# Patient Record
Sex: FEMALE | Race: BLACK OR AFRICAN AMERICAN | NOT HISPANIC OR LATINO | Employment: FULL TIME | URBAN - METROPOLITAN AREA
[De-identification: names, ages, dates, MRNs, and addresses within clinical notes are randomized per-mention and may not be internally consistent; named-entity substitution may affect disease eponyms.]

---

## 2017-05-24 ENCOUNTER — APPOINTMENT (OUTPATIENT)
Dept: WOMENS IMAGING | Facility: HOSPITAL | Age: 52
End: 2017-05-24

## 2017-05-24 PROCEDURE — G0202 SCR MAMMO BI INCL CAD: HCPCS | Performed by: RADIOLOGY

## 2017-05-24 PROCEDURE — 77067 SCR MAMMO BI INCL CAD: CPT | Performed by: RADIOLOGY

## 2018-03-22 ENCOUNTER — HOSPITAL ENCOUNTER (EMERGENCY)
Facility: HOSPITAL | Age: 53
Discharge: HOME | End: 2018-03-22
Attending: EMERGENCY MEDICINE | Admitting: EMERGENCY MEDICINE

## 2018-03-22 ENCOUNTER — APPOINTMENT (OUTPATIENT)
Dept: GENERAL RADIOLOGY | Facility: HOSPITAL | Age: 53
End: 2018-03-22

## 2018-03-22 VITALS
BODY MASS INDEX: 35.99 KG/M2 | DIASTOLIC BLOOD PRESSURE: 71 MMHG | HEART RATE: 72 BPM | RESPIRATION RATE: 18 BRPM | SYSTOLIC BLOOD PRESSURE: 121 MMHG | TEMPERATURE: 98.1 F | OXYGEN SATURATION: 98 % | WEIGHT: 216 LBS | HEIGHT: 65 IN

## 2018-03-22 DIAGNOSIS — S39.012A LUMBAR SPINE STRAIN, INITIAL ENCOUNTER: ICD-10-CM
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
## 2018-03-22 DIAGNOSIS — S16.1XXA CERVICAL STRAIN, ACUTE, INITIAL ENCOUNTER: ICD-10-CM

## 2018-03-22 PROCEDURE — 99283 EMERGENCY DEPT VISIT LOW MDM: CPT

## 2018-03-22 PROCEDURE — 72110 X-RAY EXAM L-2 SPINE 4/>VWS: CPT

## 2018-03-22 PROCEDURE — 72050 X-RAY EXAM NECK SPINE 4/5VWS: CPT

## 2018-03-22 RX ORDER — BACLOFEN 10 MG/1
10 TABLET ORAL 3 TIMES DAILY PRN
Qty: 21 TABLET | Refills: 0 | Status: SHIPPED | OUTPATIENT
Start: 2018-03-22 | End: 2022-03-09 | Stop reason: ALTCHOICE
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
## 2018-04-03 ENCOUNTER — TRANSCRIBE ORDERS (OUTPATIENT)
Dept: PHYSICAL THERAPY | Facility: CLINIC | Age: 53
End: 2018-04-03

## 2018-04-03 DIAGNOSIS — S16.1XXA STRAIN OF NECK MUSCLE, INITIAL ENCOUNTER: Primary | ICD-10-CM

## 2018-04-03 DIAGNOSIS — S39.012A STRAIN OF LUMBAR REGION, INITIAL ENCOUNTER: ICD-10-CM

## 2018-04-05 ENCOUNTER — TREATMENT (OUTPATIENT)
Dept: PHYSICAL THERAPY | Facility: CLINIC | Age: 53
End: 2018-04-05

## 2018-04-05 DIAGNOSIS — M54.2 PAIN, NECK: ICD-10-CM

## 2018-04-05 DIAGNOSIS — M54.41 ACUTE RIGHT-SIDED LOW BACK PAIN WITH RIGHT-SIDED SCIATICA: Primary | ICD-10-CM

## 2018-04-05 PROCEDURE — 97001 PR PHYS THERAPY EVALUATION: CPT | Performed by: PHYSICAL THERAPIST

## 2018-04-05 PROCEDURE — 97014 ELECTRIC STIMULATION THERAPY: CPT | Performed by: PHYSICAL THERAPIST

## 2018-04-05 PROCEDURE — 97110 THERAPEUTIC EXERCISES: CPT | Performed by: PHYSICAL THERAPIST
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
## 2018-04-06 ENCOUNTER — TREATMENT (OUTPATIENT)
Dept: PHYSICAL THERAPY | Facility: CLINIC | Age: 53
End: 2018-04-06

## 2018-04-06 DIAGNOSIS — M54.2 PAIN, NECK: ICD-10-CM

## 2018-04-06 DIAGNOSIS — M54.41 ACUTE RIGHT-SIDED LOW BACK PAIN WITH RIGHT-SIDED SCIATICA: Primary | ICD-10-CM

## 2018-04-06 PROCEDURE — 97112 NEUROMUSCULAR REEDUCATION: CPT | Performed by: PHYSICAL THERAPIST

## 2018-04-06 PROCEDURE — 97014 ELECTRIC STIMULATION THERAPY: CPT | Performed by: PHYSICAL THERAPIST

## 2018-04-06 PROCEDURE — 97110 THERAPEUTIC EXERCISES: CPT | Performed by: PHYSICAL THERAPIST

## 2018-04-06 NOTE — PROGRESS NOTES
Physical Therapy Daily Progress Note    Time In 228  Time Out 326        Subjective  Patient reports that her back feels a little better (pain 3/10), pain in the neck is at a 5/10.    Objective   See Exercise, Manual, and Modality Logs for complete treatment.       Assessment/Plan  Patient tolerated the progression of exercises with visual or verbal signs of pain or discomfort in the cervical or lumbar spine.  Added KT to the lumbar spine to help with the c/o tightness.                     Manual Therapy:    0     mins  13543;  Therapeutic Exercise:    31     mins  12714;     Neuromuscular Abbie:    8    mins  50601;    Therapeutic Activity:     0     mins  20092;     Gait Trainin     mins  75875;     Ultrasound:     0     mins  22346;    Work Hardening           0      mins 82262  Iontophoresis               0   mins 42303    Timed Treatment:   39   mins   Total Treatment:     58   mins    Titi Orozco, PT  Physical Therapist

## 2018-04-09 ENCOUNTER — TREATMENT (OUTPATIENT)
Dept: PHYSICAL THERAPY | Facility: CLINIC | Age: 53
End: 2018-04-09

## 2018-04-09 DIAGNOSIS — M54.41 ACUTE RIGHT-SIDED LOW BACK PAIN WITH RIGHT-SIDED SCIATICA: Primary | ICD-10-CM

## 2018-04-09 DIAGNOSIS — M54.2 PAIN, NECK: ICD-10-CM

## 2018-04-09 PROCEDURE — 97014 ELECTRIC STIMULATION THERAPY: CPT | Performed by: PHYSICAL THERAPIST

## 2018-04-09 PROCEDURE — 97112 NEUROMUSCULAR REEDUCATION: CPT | Performed by: PHYSICAL THERAPIST

## 2018-04-09 PROCEDURE — 97110 THERAPEUTIC EXERCISES: CPT | Performed by: PHYSICAL THERAPIST

## 2018-04-09 NOTE — PROGRESS NOTES
"Re-Assessment / Re-Certification        Patient: Jazlyn Cole   : 1965  Diagnosis/ICD-10 Code:  Acute right-sided low back pain with right-sided sciatica [M54.41]  Referring practitioner: Shay Sanchez MD  Patient seen for 3 sessions      Subjective:   Jazlyn Cole reports:  Clinical Progress: improved  Home Program Compliance: Yes  Treatment has included: therapeutic exercise, neuromuscular re-education, electrical stimulation and moist heat    Subjective Evaluation    History of Present Illness    Subjective comment: the pt reports her LBP is improving, but she still has constant neck pain.  The pt reports compliance with her HEP, but is not sure if the stretches are helping to decrease her pain.     Objective       Active Range of Motion     Additional Active Range of Motion Details  Lumbar ROM: all movements are WFL/full range, but the pt reports she has a \"pulling\" feeling in her back with flex and bilateral side bending and \"discomfort\" in her back with extension.     strength:   R: 15#/force, but required verbal and tactile cues to .  L: 20#/force, but required verbal and tactile cues to .    The pt demo the ability to have WFL for all shoulder ROM and only reported slight stretching discomfort when asked.    The pt's cervical ROM is WFL bilaterally and only had pain with L rotation.     Assessment & Plan     Assessment  Assessment details: The pt has slight improvement since her initial evaluation with no reports of constant headaches, no numbness/tingling into her leg with driving, and full ROM with shoulder and cervical ROM.  The pt demo the ability to perform work simulated squatting with requiring few cues for proper form.      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  The pt would still benefit from skilled PT care to address these limitations to increase her ROM, strength " and repetitive movements for work.  It is unclear why the pt has a dramatically limited  strength bilaterally and compared to her initial evaluation.  The pt reports she does not have  strength loss to her knowledge.  Prognosis details: Short Term Goals (2 weeks)    Patient is independent with HEP. MET  Patient is able to sleep without being disrupted by pain. NOT MET, reports waking 2x/night  Patient has full AROM/PROM of right shoulder abd. NO TMET  Patient has greater than 50% improvement overall. NOT MET, 40%  Patient is able to reach into overhead cabinets with minimal discomfort. NOT MET    Long Term Goals (4 weeks)    Patient is able to return to work with minimal to no discomfort. NOT MET  Patient is able to lift 50 lbs from floor to waist. NOT MET  Patient is able to lift 20 lbs from waist to shoulder. NOT MET  Patient is able to have equal  strength bilaterally. 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 MET  Patient is able to open a jar as needed for daily tasks.  NOT MET    Plan  Plan details: Continue skilled PT care to address functional limitations of cervical ROM and strength needed for work duties to return the pt to her PLOF, as appropriate.        Progress toward previous goals: Partially Met  Recommendations: Continue as planned  Timeframe: 1 month  Prognosis to achieve goals: good    PT Signature: Hannah Bashir PT      Based upon review of the patient's progress and continued therapy plan, it is my medical opinion that Jazlyn Cole should continue physical therapy treatment at [facility]  [address] [place] [id number] [phone].    Signature: __________________________________  Shay Sanchez MD    Manual Therapy:    0     mins  32913;  Therapeutic Exercise:    23     mins  37810;     Neuromuscular Abbie:    23    mins  23323;    Therapeutic Activity:      0     mins  70060;     Gait Trainin     mins  06116;     Ultrasound:     0     mins  54686;    Work Hardening           0      mins 32740  Iontophoresis               0   mins 66993    Timed Treatment:   46   mins   Total Treatment:     61   mins

## 2018-04-11 ENCOUNTER — TREATMENT (OUTPATIENT)
Dept: PHYSICAL THERAPY | Facility: CLINIC | Age: 53
End: 2018-04-11

## 2018-04-11 DIAGNOSIS — M54.41 ACUTE RIGHT-SIDED LOW BACK PAIN WITH RIGHT-SIDED SCIATICA: Primary | ICD-10-CM

## 2018-04-11 DIAGNOSIS — M54.2 PAIN, NECK: ICD-10-CM

## 2018-04-11 PROCEDURE — 97112 NEUROMUSCULAR REEDUCATION: CPT | Performed by: PHYSICAL THERAPIST

## 2018-04-11 PROCEDURE — 97140 MANUAL THERAPY 1/> REGIONS: CPT | Performed by: PHYSICAL THERAPIST

## 2018-04-11 PROCEDURE — 97110 THERAPEUTIC EXERCISES: CPT | Performed by: PHYSICAL THERAPIST

## 2018-04-11 NOTE — PROGRESS NOTES
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  She reports although she did stretch after driving, the pain continued.  The pt reports her neck pain continues to have no change.       Objective   See Exercise, Manual, and Modality Logs for complete treatment.       Assessment & Plan     Assessment  Assessment details: The pt is tolerating treatment well without reports of pain or a negative change in status.  The pt demo an increase in strength and endurance with the ability to complete more exercises and exercise with a resistance band.  The pt's LBP was dramatically decreased following lumbar extension exercises and manual therapy.  The pt reported muscle soreness following her exercises, but no pain.  Following modalities, the pt did not report pain or any soreness.  The pt was educated on the potential for DOMS.  The pt was also encouraged to use a lumbar support pillow in her car secondary to spending an increased amount of time in her car for work to address her lumbar pain relief.     Plan  Plan details: Continue skilled PT care to address functional limitations of lumbar extension ROM and back pain to return the pt to her PLOF, as appropriate.                       Manual Therapy:    13     mins  46677;  Therapeutic Exercise:    21     mins  83703;     Neuromuscular Abbie:    23    mins  58281;    Therapeutic Activity:     0     mins  38206;     Gait Trainin     mins  85649;     Ultrasound:     0     mins  24550;    Work Hardening           0      mins 37138  Iontophoresis               0   mins 40864    Timed Treatment:   57   mins   Total Treatment:     72   mins    Hannah Bashir PT  Physical Therapist

## 2018-04-13 ENCOUNTER — TREATMENT (OUTPATIENT)
Dept: PHYSICAL THERAPY | Facility: CLINIC | Age: 53
End: 2018-04-13

## 2018-04-13 DIAGNOSIS — M54.2 PAIN, NECK: ICD-10-CM

## 2018-04-13 DIAGNOSIS — M54.41 ACUTE RIGHT-SIDED LOW BACK PAIN WITH RIGHT-SIDED SCIATICA: Primary | ICD-10-CM

## 2018-04-13 PROCEDURE — 97110 THERAPEUTIC EXERCISES: CPT | Performed by: PHYSICAL THERAPIST

## 2018-04-13 PROCEDURE — 97112 NEUROMUSCULAR REEDUCATION: CPT | Performed by: PHYSICAL THERAPIST

## 2018-04-13 PROCEDURE — 97140 MANUAL THERAPY 1/> REGIONS: CPT | Performed by: PHYSICAL THERAPIST

## 2018-04-13 NOTE — PROGRESS NOTES
Physical Therapy Daily Progress Note            Subjective Evaluation    History of Present Illness    Subjective comment: The pt reports her LBP is tight from her last appointment, but has no pain.  The pt reports although she is compliant with her HEP she does not have a change in neck stiffness, but does feel there is a slight improvement in pain.       Objective   See Exercise, Manual, and Modality Logs for complete treatment.       Assessment & Plan     Assessment  Assessment details: The pt is tolerating treatment well without reports of pain or a negative change in status.  The pt demo an increase in shoulder strength with the ability to complete more exercises and exercises with a resistance band.  The pt reported muscular fatigue during exercises, but was able to complete a second set following passive stretching.  The pt's hips presented with a posteriorly rotated L pelvis and following manual therapy, the hips presented with a more level and equal presentation.  The pt left the appointment without reports of pain.     Plan  Plan details: Continue skilled PT care to address functional limitations of cervical and lumbar ROM and strength to return the pt to her PLOF, as appropriate.                       Manual Therapy:   12     mins  61604;  Therapeutic Exercise:    21     mins  75896;     Neuromuscular Abbie:    25    mins  23527;    Therapeutic Activity:     0     mins  64582;     Gait Trainin     mins  83063;     Ultrasound:     0     mins  11110;    Work Hardening           0      mins 02623  Iontophoresis               0   mins 60213    Timed Treatment:   58   mins   Total Treatment:     58   mins    Hannah Bashir PT  Physical Therapist

## 2018-04-16 ENCOUNTER — TREATMENT (OUTPATIENT)
Dept: PHYSICAL THERAPY | Facility: CLINIC | Age: 53
End: 2018-04-16

## 2018-04-16 DIAGNOSIS — M54.41 ACUTE RIGHT-SIDED LOW BACK PAIN WITH RIGHT-SIDED SCIATICA: Primary | ICD-10-CM

## 2018-04-16 DIAGNOSIS — M54.2 PAIN, NECK: ICD-10-CM

## 2018-04-16 PROCEDURE — 97112 NEUROMUSCULAR REEDUCATION: CPT | Performed by: PHYSICAL THERAPIST

## 2018-04-16 PROCEDURE — 97110 THERAPEUTIC EXERCISES: CPT | Performed by: PHYSICAL THERAPIST

## 2018-04-16 PROCEDURE — 97140 MANUAL THERAPY 1/> REGIONS: CPT | Performed by: PHYSICAL THERAPIST

## 2018-04-20 ENCOUNTER — TREATMENT (OUTPATIENT)
Dept: PHYSICAL THERAPY | Facility: CLINIC | Age: 53
End: 2018-04-20

## 2018-04-20 DIAGNOSIS — M54.41 ACUTE RIGHT-SIDED LOW BACK PAIN WITH RIGHT-SIDED SCIATICA: Primary | ICD-10-CM

## 2018-04-20 PROCEDURE — 97110 THERAPEUTIC EXERCISES: CPT | Performed by: PHYSICAL THERAPIST

## 2018-04-20 NOTE — PROGRESS NOTES
Physical Therapy Daily Progress Note            Subjective Patient states that her back feels a lot better, but reports a little pain in the neck.      Objective   See Exercise, Manual, and Modality Logs for complete treatment.       Assessment/Plan  Patient performed the exercise routine without c/o pain or discomfort in the cervical.   Held estim and ice per patient request.                 Manual Therapy:    0     mins  20194;  Therapeutic Exercise:    50     mins  94754;     Neuromuscular Abbie:    0    mins  07774;    Therapeutic Activity:     0     mins  89971;     Gait Trainin     mins  79231;     Ultrasound:     0     mins  24437;    Work Hardening           0      mins 53918  Iontophoresis               0   mins 50461    Timed Treatment:   50   mins   Total Treatment:     60   mins    Akua Lee, PT  Physical Therapist

## 2018-05-01 ENCOUNTER — TREATMENT (OUTPATIENT)
Dept: PHYSICAL THERAPY | Facility: CLINIC | Age: 53
End: 2018-05-01

## 2018-05-01 DIAGNOSIS — M54.2 PAIN, NECK: ICD-10-CM

## 2018-05-01 DIAGNOSIS — M54.41 ACUTE RIGHT-SIDED LOW BACK PAIN WITH RIGHT-SIDED SCIATICA: Primary | ICD-10-CM

## 2018-05-01 PROCEDURE — 97110 THERAPEUTIC EXERCISES: CPT | Performed by: PHYSICAL THERAPIST

## 2018-05-01 NOTE — PROGRESS NOTES
"Physical Therapy Daily Progress Note            Subjective Evaluation    History of Present Illness    Subjective comment: The pt reports she has good days and bad days at work.  She reports she has been required to perform more physically demanding tasks at work (driving longer, puttting together more machines, bending/twisting more) and it has increased her LBP. Her cerivcal pain comes and goes.  The pt reports stretching and her HEP does help.  Currently, 6/10 neck pain and 4/10 LBP.       Objective       Active Range of Motion     Additional Active Range of Motion Details  The pt demo WFL for all lumbar movements and only reported a stretching pull in her low back with bilateral side bending; no pain.    The pt demo WFL for all cervical movements and no reports of pain to complete the movements.  However, the pt reports she has pain in her neck that \"comes and goes\" without a connection to activity or movement.     strength: bilaterally: 30#/force     See Exercise, Manual, and Modality Logs for complete treatment.       Assessment & Plan     Assessment  Assessment details: The pt is tolerating treatment well without reports of pain or a negative change in status.  The pt demo an increase in activity endurance with the ability to complete an UE exercise AG, an UE exercise with an increase in resistance band level, and a balance/strengthening exercise while on a noncompliant surface without reports of pain.  The pt did have a \"muscle spasm\" in her L low back following prone leg extension, but following LE and back stretching, the spasm went away and she was able to complete the rest of her exercises without pains or compensations.  The pt left her appointment with reports of pain decrease compared to her initial arrival.    Prognosis details: Short Term Goals (2 weeks)    Patient is independent with HEP.  MET  Patient is able to sleep without being disrupted by pain. NOT MET, still reports waking with tingling " into both hands 3x/week.  Patient has full AROM/PROM of right shoulder abd. MET  Patient has greater than 50% improvement overall. BACK: 80% at PLOF, NECK: 50%   Patient is able to reach into overhead cabinets with minimal discomfort. MET    Long Term Goals (4 weeks)    Patient is able to return to work with minimal to no discomfort. NOT MET  Patient is able to lift 50 lbs from floor to waist. NOT MET  Patient is able to lift 20 lbs from waist to shoulder. NOT MET  Patient is able to have equal  strength bilaterally. MET, 30#/force bilaterally.  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 MET  Patient is able to open a jar as needed for daily tasks. MET    Plan  Plan details: Continue skilled PT care to address functional limitations of work simulated tasks to return the pt to her PLOF, as appropriate.                       Manual Therapy:    0     mins  34694;  Therapeutic Exercise:    60     mins  77981;     Neuromuscular Abbie:    0    mins  99105;    Therapeutic Activity:     0     mins  09849;     Gait Trainin     mins  58777;     Ultrasound:     0     mins  78372;    Work Hardening           0      mins 95214  Iontophoresis               0   mins 80859    Timed Treatment:   60   mins   Total Treatment:     60   mins    Hannah Bashir, PT  Physical Therapist

## 2018-05-02 ENCOUNTER — TRANSCRIBE ORDERS (OUTPATIENT)
Dept: ADMINISTRATIVE | Facility: HOSPITAL | Age: 53
End: 2018-05-02

## 2018-05-02 ENCOUNTER — TREATMENT (OUTPATIENT)
Dept: PHYSICAL THERAPY | Facility: CLINIC | Age: 53
End: 2018-05-02

## 2018-05-02 DIAGNOSIS — M54.41 ACUTE RIGHT-SIDED LOW BACK PAIN WITH RIGHT-SIDED SCIATICA: Primary | ICD-10-CM

## 2018-05-02 DIAGNOSIS — S16.1XXD STRAIN, CERVICAL, SUBSEQUENT ENCOUNTER: Primary | ICD-10-CM

## 2018-05-02 DIAGNOSIS — M54.2 PAIN, NECK: ICD-10-CM

## 2018-05-02 DIAGNOSIS — M54.15 RADICULOPATHY OF THORACOLUMBAR REGION: ICD-10-CM

## 2018-05-02 DIAGNOSIS — S39.012D LUMBAR SPINE STRAIN, SUBSEQUENT ENCOUNTER: ICD-10-CM

## 2018-05-02 PROCEDURE — 97530 THERAPEUTIC ACTIVITIES: CPT | Performed by: PHYSICAL THERAPIST

## 2018-05-02 PROCEDURE — 97110 THERAPEUTIC EXERCISES: CPT | Performed by: PHYSICAL THERAPIST

## 2018-05-02 PROCEDURE — 97014 ELECTRIC STIMULATION THERAPY: CPT | Performed by: PHYSICAL THERAPIST

## 2018-05-02 NOTE — PROGRESS NOTES
"Re-Assessment / Re-Certification        Patient: Jazlyn Cole   : 1965  Diagnosis/ICD-10 Code:  Acute right-sided low back pain with right-sided sciatica [M54.41]  Referring practitioner: Shay Sanchez MD  Patient seen for 9 sessions      Subjective:   Jazlyn Cole reports:     Clinical Progress: improved  Home Program Compliance: Yes  Treatment has included: therapeutic exercise and therapeutic activity    Subjective Evaluation    History of Present Illness    Subjective comment: The pt reports her neck and back pains have improved and she finds her HEP helps.  She reports she is able to stand, sit, walk, and reach [place] at her PLOF.  She still has discomfort with driving.       Objective       Active Range of Motion     Additional Active Range of Motion Details  The pt demo WFL for all lumbar movements and only reported a stretching pull in her low back with bilateral side bending; no pain.    The pt demo WFL for all cervical movements and no reports of pain to complete the movements.  However, the pt reports she has pain in her neck that \"comes and goes\" without a connection to activity or movement.     strength: bilaterally: 30#/force     Assessment & Plan     Assessment  Assessment details: The pt has improved from her initial evaluation with improvement in lumbar, shoulder and cervical ROM, increase in strength, return to her PLOF for sitting, standing and walking abilities, and demo ability to complete work simulated lifting of waist/waist 10#, 15#, 20#, 5x each, and waist/floor 20# 5x with proper form and no reports of pain.    The pt continues to report pain in her neck and occasional headaches throughout her day with an increase after driving.    Prognosis details: Short Term Goals (2 weeks)    Patient is independent with HEP.  MET  Patient is able to sleep without being disrupted by pain. NOT MET, still reports waking with tingling into both hands 3x/week.  Patient has full " AROM/PROM of right shoulder abd. MET  Patient has greater than 50% improvement overall. BACK: 80% at PLOF, NECK: 50%   Patient is able to reach into overhead cabinets with minimal discomfort. MET    Long Term Goals (4 weeks)    Patient is able to return to work with minimal to no discomfort. NOT MET  Patient is able to lift 50 lbs from floor to waist. NOT MET  Patient is able to lift 20 lbs from waist to shoulder. NOT MET  Patient is able to have equal  strength bilaterally. MET, 30#/force bilaterally.  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 MET  Patient is able to open a jar as needed for daily tasks. MET    Plan  Plan details: Continue skilled PT care to address functional limitations of work simulated tasks to return the pt to her PLOF, as appropriate.          PT Signature: Hannah Bashir PT      Based upon review of the patient's progress and continued therapy plan, it is my medical opinion that Jazlyn Cole should continue physical therapy treatment at [facility]  [address] [place] [id number] [phone].    Signature: __________________________________  Shay Sanchez MD    Manual Therapy:    0     mins  85873;  Therapeutic Exercise:    38     mins  80918;     Neuromuscular Abbie:    0    mins  38176;    Therapeutic Activity:     8     mins  30368;     Gait Trainin     mins  58042;     Ultrasound:     0     mins  45864;    Work Hardening           0      mins 84873  Iontophoresis               0   mins 17406    Timed Treatment:   46   mins   Total Treatment:     61   mins

## 2018-05-11 ENCOUNTER — HOSPITAL ENCOUNTER (OUTPATIENT)
Dept: MRI IMAGING | Facility: HOSPITAL | Age: 53
Discharge: HOME | End: 2018-05-11

## 2018-05-11 ENCOUNTER — HOSPITAL ENCOUNTER (OUTPATIENT)
Dept: MRI IMAGING | Facility: HOSPITAL | Age: 53
Discharge: HOME | End: 2018-05-11
Admitting: PREVENTIVE MEDICINE

## 2018-05-11 DIAGNOSIS — S16.1XXD STRAIN, CERVICAL, SUBSEQUENT ENCOUNTER: ICD-10-CM

## 2018-05-11 DIAGNOSIS — M54.15 RADICULOPATHY OF THORACOLUMBAR REGION: ICD-10-CM

## 2018-05-11 DIAGNOSIS — S39.012D LUMBAR SPINE STRAIN, SUBSEQUENT ENCOUNTER: ICD-10-CM

## 2018-05-11 PROCEDURE — 72148 MRI LUMBAR SPINE W/O DYE: CPT

## 2018-05-11 PROCEDURE — 72141 MRI NECK SPINE W/O DYE: CPT

## 2018-05-15 ENCOUNTER — TREATMENT (OUTPATIENT)
Dept: PHYSICAL THERAPY | Facility: CLINIC | Age: 53
End: 2018-05-15

## 2018-05-15 DIAGNOSIS — M54.2 PAIN, NECK: ICD-10-CM

## 2018-05-15 DIAGNOSIS — M54.41 ACUTE RIGHT-SIDED LOW BACK PAIN WITH RIGHT-SIDED SCIATICA: Primary | ICD-10-CM

## 2018-05-15 PROCEDURE — 97110 THERAPEUTIC EXERCISES: CPT | Performed by: PHYSICAL THERAPIST

## 2018-05-15 PROCEDURE — 97530 THERAPEUTIC ACTIVITIES: CPT | Performed by: PHYSICAL THERAPIST

## 2018-05-15 NOTE — PROGRESS NOTES
Physical Therapy Daily Progress Note            Subjective Evaluation    History of Present Illness    Subjective comment: The pt reports her LBP continues to improve.  She reports her neck pain is still present and limits her from driving and looking around her work sites.  She states the neck pain is less than the original pains, but is still present.       Objective   See Exercise, Manual, and Modality Logs for complete treatment.       Assessment & Plan     Assessment  Assessment details: The pt was able to demo an increase in cervical control with supine cervical extension and extension with rotation to address her forward head, rounded shoulder posture and to stretch the suboccipital muscles to alive the neck pains.  The pt left her appointment without reports of pain.    Plan  Plan details: Continue skilled PT care to address functional limitations of cervical ROM and neuromuscular control to return the pt to her PLOF, as appropriate.                       Manual Therapy:    0     mins  19588;  Therapeutic Exercise:    38     mins  35148;     Neuromuscular Abbie:    0    mins  89104;    Therapeutic Activity:     8     mins  24109;     Gait Trainin     mins  12574;     Ultrasound:     0     mins  03672;    Work Hardening           0      mins 79194  Iontophoresis               0   mins 35896    Timed Treatment:   48   mins   Total Treatment:     48   mins    Hannah Bashir PT  Physical Therapist

## 2018-05-16 ENCOUNTER — TREATMENT (OUTPATIENT)
Dept: PHYSICAL THERAPY | Facility: CLINIC | Age: 53
End: 2018-05-16

## 2018-05-16 DIAGNOSIS — M54.2 PAIN, NECK: ICD-10-CM

## 2018-05-16 DIAGNOSIS — M54.41 ACUTE RIGHT-SIDED LOW BACK PAIN WITH RIGHT-SIDED SCIATICA: Primary | ICD-10-CM

## 2018-05-16 PROCEDURE — 97110 THERAPEUTIC EXERCISES: CPT | Performed by: PHYSICAL THERAPIST

## 2018-05-16 NOTE — PROGRESS NOTES
Physical Therapy Daily Progress Note            Subjective Evaluation    History of Present Illness    Subjective comment: The pt reports her neck pain has decreased compared to her previous appointment and found the new cervical exercises may have added to her decreased pain.  The pt reports compliance with her HEP and plans to continue them while on vacation.       Objective   See Exercise, Manual, and Modality Logs for complete treatment.       Assessment & Plan     Assessment  Assessment details: The pt is tolerating treatment well without reports of pain or a negative change in status.  The pt demo an increase in activity endurance with the ability to complete more exercises without reports of pain.  The pt was also able to complete AAROM for shoulder flexion, bilaterally, without reports of neck pain or numbness/tingling into her hands.  The pt left her appointment without reports of pain.      Plan  Plan details: Continue skilled PT care to address functional limitations of cervical and lumbar ROM, strength, and work simulation to return the pt to her PLOF, as appropriate.                       Manual Therapy:    0     mins  00657;  Therapeutic Exercise:    54     mins  83881;     Neuromuscular Abbie:    0    mins  59417;    Therapeutic Activity:     0     mins  02639;     Gait Trainin     mins  56740;     Ultrasound:     0     mins  67149;    Work Hardening           0      mins 69672  Iontophoresis               0   mins 21871    Timed Treatment:   54   mins   Total Treatment:     54   mins    Hannah Bashir PT  Physical Therapist

## 2018-05-25 ENCOUNTER — APPOINTMENT (OUTPATIENT)
Dept: WOMENS IMAGING | Facility: HOSPITAL | Age: 53
End: 2018-05-25

## 2018-05-25 ENCOUNTER — TREATMENT (OUTPATIENT)
Dept: PHYSICAL THERAPY | Facility: CLINIC | Age: 53
End: 2018-05-25

## 2018-05-25 DIAGNOSIS — M54.2 PAIN, NECK: ICD-10-CM

## 2018-05-25 DIAGNOSIS — M54.41 ACUTE RIGHT-SIDED LOW BACK PAIN WITH RIGHT-SIDED SCIATICA: Primary | ICD-10-CM

## 2018-05-25 PROCEDURE — 77067 SCR MAMMO BI INCL CAD: CPT | Performed by: RADIOLOGY

## 2018-05-25 PROCEDURE — 97112 NEUROMUSCULAR REEDUCATION: CPT | Performed by: PHYSICAL THERAPIST

## 2018-05-25 PROCEDURE — 97110 THERAPEUTIC EXERCISES: CPT | Performed by: PHYSICAL THERAPIST

## 2018-05-25 NOTE — PROGRESS NOTES
Re-Assessment / Re-Certification    Time In 200 Time Out 235    Patient: Jazlyn Cole   : 1965  Diagnosis/ICD-10 Code:  Acute right-sided low back pain with right-sided sciatica [M54.41]  Referring practitioner: Shay Sanchez MD  Date of Initial Visit: 2018  Today's Date: 18  Patient seen for 12 sessions      Subjective:   Jazlyn Cole reports: that her back is a lot better, denies pain in the back; rates the pain in the neck at -5/10.    Treatment has included: therapeutic exercise, neuromuscular re-education, electrical stimulation and cryotherapy    Subjective   Objective       Palpation     Additional Palpation Details  No TTP to cervical spine region    Active Range of Motion   Cervical/Thoracic Spine   Cervical    Flexion: 39 degrees   Extension: 31 degrees   Left lateral flexion: 18 degrees   Right lateral flexion: 25 degrees     Lumbar   Flexion: 110 degrees   Extension: 28 degrees   Left lateral flexion: 25 degrees   Right lateral flexion: 23 degrees     Additional Active Range of Motion Details  Shoulders:  Flexion WNL, Abduction WNL     Assessment/Plan  Patient has tolerated PT well thus far.  Patient has not been seen since  secondary to being out of town.  Deficits persist with the cerivcal spine, including pain and decreased AROM.  Patient has been seen since 18 and may need further medical management at this time secondary to persistent S/S's.    PT Signature: Titi Orozco, PT        Manual Therapy:    0     mins  82518;  Therapeutic Exercise:    26     mins  38224;     Neuromuscular Abbie:    8    mins  67298;    Therapeutic Activity:     0     mins  75941;     Gait Trainin     mins  64785;     Ultrasound:     0     mins  78330;    Work Hardening           0      mins 21910  Iontophoresis               0   mins 24119    Timed Treatment:   34   mins   Total Treatment:     35   mins

## 2018-06-15 ENCOUNTER — DOCUMENTATION (OUTPATIENT)
Dept: PHYSICAL THERAPY | Facility: CLINIC | Age: 53
End: 2018-06-15

## 2018-06-15 NOTE — PROGRESS NOTES
Discharge Summary  Discharge Summary from Physical Therapy Report      Dates  PT visit: 4/5 to 5/25/18  Number of Visits: 12     Discharge Status of Patient: See MD Note dated 5/25/18    Goals: Partially Met    Discharge Plan: Continue with current home exercise program as instructed    Comments Patient did not return to PT.    Date of Discharge 6/15/18        Titi Orozco, PT  Physical Therapist

## 2018-07-19 ENCOUNTER — HOSPITAL ENCOUNTER (OUTPATIENT)
Dept: GENERAL RADIOLOGY | Facility: HOSPITAL | Age: 53
Discharge: HOME | End: 2018-07-19
Admitting: NURSE PRACTITIONER

## 2018-07-19 DIAGNOSIS — R52 PAIN: ICD-10-CM

## 2018-07-19 PROCEDURE — 73562 X-RAY EXAM OF KNEE 3: CPT

## 2018-07-19 PROCEDURE — 73560 X-RAY EXAM OF KNEE 1 OR 2: CPT

## 2019-03-16 ENCOUNTER — OFFICE VISIT (OUTPATIENT)
Dept: RETAIL CLINIC | Facility: CLINIC | Age: 54
End: 2019-03-16

## 2019-03-16 VITALS
OXYGEN SATURATION: 98 % | HEIGHT: 65 IN | SYSTOLIC BLOOD PRESSURE: 116 MMHG | TEMPERATURE: 98 F | RESPIRATION RATE: 18 BRPM | WEIGHT: 247 LBS | DIASTOLIC BLOOD PRESSURE: 78 MMHG | HEART RATE: 80 BPM | BODY MASS INDEX: 41.15 KG/M2

## 2019-03-16 DIAGNOSIS — R21 RASH AND NONSPECIFIC SKIN ERUPTION: Primary | ICD-10-CM

## 2019-03-16 DIAGNOSIS — L29.9 ITCHING: ICD-10-CM

## 2019-03-16 PROCEDURE — 99213 OFFICE O/P EST LOW 20 MIN: CPT | Performed by: NURSE PRACTITIONER

## 2019-03-16 RX ORDER — HYDROXYZINE HYDROCHLORIDE 25 MG/1
25 TABLET, FILM COATED ORAL 3 TIMES DAILY PRN
Qty: 30 TABLET | Refills: 0 | Status: SHIPPED | OUTPATIENT
Start: 2019-03-16 | End: 2019-03-26

## 2019-03-16 RX ORDER — DESONIDE 0.5 MG/G
CREAM TOPICAL 3 TIMES DAILY
Qty: 15 G | Refills: 0 | Status: SHIPPED | OUTPATIENT
Start: 2019-03-16 | End: 2019-03-26

## 2019-03-16 NOTE — PATIENT INSTRUCTIONS
Rash  A rash is a change in the color of the skin. A rash can also change the way your skin feels. There are many different conditions and factors that can cause a rash.  Follow these instructions at home:  Pay attention to any changes in your symptoms. Follow these instructions to help with your condition:  Medicine  Take or apply over-the-counter and prescription medicines only as told by your doctor. These may include:  · Corticosteroid cream.  · Anti-itch lotions.  · Oral antihistamines.    Skin Care  · Put cool compresses on the affected areas.  · Try taking a bath with:  ? Epsom salts. Follow the instructions on the packaging. You can get these at your local pharmacy or grocery store.  ? Baking soda. Pour a small amount into the bath as told by your doctor.  ? Colloidal oatmeal. Follow the instructions on the packaging. You can get this at your local pharmacy or grocery store.  · Try putting baking soda paste onto your skin. Stir water into baking soda until it gets like a paste.  · Do not scratch or rub your skin.  · Avoid covering the rash. Make sure the rash is exposed to air as much as possible.  General instructions  · Avoid hot showers or baths, which can make itching worse. A cold shower may help.  · Avoid scented soaps, detergents, and perfumes. Use gentle soaps, detergents, perfumes, and other cosmetic products.  · Avoid anything that causes your rash. Keep a journal to help track what causes your rash. Write down:  ? What you eat.  ? What cosmetic products you use.  ? What you drink.  ? What you wear. This includes jewelry.  · Keep all follow-up visits as told by your doctor. This is important.  Contact a doctor if:  · You sweat at night.  · You lose weight.  · You pee (urinate) more than normal.  · You feel weak.  · You throw up (vomit).  · Your skin or the whites of your eyes look yellow (jaundice).  · Your skin:  ? Tingles.  ? Is numb.  · Your rash:  ? Does not go away after a few days.  ? Gets  worse.  · You are:  ? More thirsty than normal.  ? More tired than normal.  · You have:  ? New symptoms.  ? Pain in your belly (abdomen).  ? A fever.  ? Watery poop (diarrhea).  Get help right away if:  · Your rash covers all or most of your body. The rash may or may not be painful.  · You have blisters that:  ? Are on top of the rash.  ? Grow larger.  ? Grow together.  ? Are painful.  ? Are inside your nose or mouth.  · You have a rash that:  ? Looks like purple pinprick-sized spots all over your body.  ? Has a “bull's eye” or looks like a target.  ? Is red and painful, causes your skin to peel, and is not from being in the sun too long.  This information is not intended to replace advice given to you by your health care provider. Make sure you discuss any questions you have with your health care provider.  Document Released: 06/05/2009 Document Revised: 05/25/2017 Document Reviewed: 05/04/2016  Elsevier Interactive Patient Education © 2019 [employer]

## 2019-03-16 NOTE — PROGRESS NOTES
"Subjective   Jazlyn Cole is a 53 y.o. female.     /78 (BP Location: Left arm, Patient Position: Sitting, Cuff Size: Large Adult)   Pulse 80   Temp 98 °F (36.7 °C) (Oral)   Resp 18   Ht 165.1 cm (65\")   Wt 112 kg (247 lb)   SpO2 98%   BMI 41.10 kg/m²       Rash   This is a new problem. The current episode started in the past 7 days. The problem has been gradually worsening since onset. The affected locations include the left lower leg, left upper leg, right lower leg, right upper leg, right arm and left arm. The rash is characterized by itchiness and redness. She was exposed to nothing. Pertinent negatives include no fatigue, fever or joint pain. Past treatments include topical steroids. The treatment provided no relief.        The following portions of the patient's history were reviewed and updated as appropriate: current medications, past family history, past medical history, past social history, past surgical history and problem list.    Review of Systems   Constitutional: Positive for unexpected weight gain (was 223 - 6 months ago - pt states recently retired. may be due to excessive calorie intake). Negative for fatigue and fever.   HENT: Negative.    Eyes: Negative.    Respiratory: Negative.    Cardiovascular: Negative.    Gastrointestinal: Negative.    Endocrine: Negative.    Genitourinary: Negative.    Musculoskeletal: Negative.  Negative for joint pain.   Skin: Positive for rash and skin lesions.   Allergic/Immunologic: Negative.    Neurological: Negative.    Hematological: Negative.    Psychiatric/Behavioral: Negative.        Objective   Physical Exam   Constitutional: She is oriented to person, place, and time. She appears well-developed and well-nourished.   HENT:   Head: Normocephalic and atraumatic.   Right Ear: External ear normal.   Eyes: Conjunctivae and EOM are normal. Pupils are equal, round, and reactive to light.   Neck: Normal range of motion.   Cardiovascular: Normal rate, " regular rhythm and normal heart sounds.   Pulmonary/Chest: Effort normal and breath sounds normal.   Abdominal: Soft. Bowel sounds are normal.   Musculoskeletal: Normal range of motion.   Neurological: She is alert and oriented to person, place, and time.   Skin: Skin is warm. Capillary refill takes less than 2 seconds. Turgor is normal. Lesion and rash noted. There is erythema.   Psychiatric: She has a normal mood and affect.   Vitals reviewed.        Assessment/Plan   Jazlyn was seen today for rash.    Diagnoses and all orders for this visit:    Rash and nonspecific skin eruption  -     desonide (DESOWEN) 0.05 % cream; Apply  topically to the appropriate area as directed 3 (Three) Times a Day for 10 days.  -     PredniSONE 5 MG tablet therapy pack dosepak; Take 1 tablet by mouth Take As Directed. Take as directed on package instructions.    Itching  -     hydrOXYzine (ATARAX) 25 MG tablet; Take 1 tablet by mouth 3 (Three) Times a Day As Needed for Itching for up to 10 days.    pt to get otc claritin or zyrtec to take daily. Pt wgt discussed, pt has gained wgt. Pt to f/u with pcp for labs/ tsh checked. Pt v/u     Rash  A rash is a change in the color of the skin. A rash can also change the way your skin feels. There are many different conditions and factors that can cause a rash.  Follow these instructions at home:  Pay attention to any changes in your symptoms. Follow these instructions to help with your condition:  Medicine  Take or apply over-the-counter and prescription medicines only as told by your doctor. These may include:  · Corticosteroid cream.  · Anti-itch lotions.  · Oral antihistamines.    Skin Care  · Put cool compresses on the affected areas.  · Try taking a bath with:  ? Epsom salts. Follow the instructions on the packaging. You can get these at your local pharmacy or grocery store.  ? Baking soda. Pour a small amount into the bath as told by your doctor.  ? Colloidal oatmeal. Follow the  instructions on the packaging. You can get this at your local pharmacy or grocery store.  · Try putting baking soda paste onto your skin. Stir water into baking soda until it gets like a paste.  · Do not scratch or rub your skin.  · Avoid covering the rash. Make sure the rash is exposed to air as much as possible.  General instructions  · Avoid hot showers or baths, which can make itching worse. A cold shower may help.  · Avoid scented soaps, detergents, and perfumes. Use gentle soaps, detergents, perfumes, and other cosmetic products.  · Avoid anything that causes your rash. Keep a journal to help track what causes your rash. Write down:  ? What you eat.  ? What cosmetic products you use.  ? What you drink.  ? What you wear. This includes jewelry.  · Keep all follow-up visits as told by your doctor. This is important.  Contact a doctor if:  · You sweat at night.  · You lose weight.  · You pee (urinate) more than normal.  · You feel weak.  · You throw up (vomit).  · Your skin or the whites of your eyes look yellow (jaundice).  · Your skin:  ? Tingles.  ? Is numb.  · Your rash:  ? Does not go away after a few days.  ? Gets worse.  · You are:  ? More thirsty than normal.  ? More tired than normal.  · You have:  ? New symptoms.  ? Pain in your belly (abdomen).  ? A fever.  ? Watery poop (diarrhea).  Get help right away if:  · Your rash covers all or most of your body. The rash may or may not be painful.  · You have blisters that:  ? Are on top of the rash.  ? Grow larger.  ? Grow together.  ? Are painful.  ? Are inside your nose or mouth.  · You have a rash that:  ? Looks like purple pinprick-sized spots all over your body.  ? Has a “bull's eye” or looks like a target.  ? Is red and painful, causes your skin to peel, and is not from being in the sun too long.  This information is not intended to replace advice given to you by your health care provider. Make sure you discuss any questions you have with your health care  provider.  Document Released: 06/05/2009 Document Revised: 05/25/2017 Document Reviewed: 05/04/2016  [employer] Interactive Patient Education © 2019 [employer]

## 2022-03-09 ENCOUNTER — HOSPITAL ENCOUNTER (OUTPATIENT)
Dept: CARDIOLOGY | Facility: HOSPITAL | Age: 57
Discharge: HOME | End: 2022-03-09

## 2022-03-09 ENCOUNTER — CONSULT (OUTPATIENT)
Dept: BARIATRICS/WEIGHT MGMT | Facility: CLINIC | Age: 57
End: 2022-03-09

## 2022-03-09 ENCOUNTER — LAB (OUTPATIENT)
Dept: LAB | Facility: HOSPITAL | Age: 57
End: 2022-03-09

## 2022-03-09 ENCOUNTER — HOSPITAL ENCOUNTER (OUTPATIENT)
Dept: GENERAL RADIOLOGY | Facility: HOSPITAL | Age: 57
Discharge: HOME | End: 2022-03-09

## 2022-03-09 VITALS
TEMPERATURE: 96.9 F | HEIGHT: 65 IN | WEIGHT: 246 LBS | SYSTOLIC BLOOD PRESSURE: 132 MMHG | DIASTOLIC BLOOD PRESSURE: 81 MMHG | HEART RATE: 78 BPM | RESPIRATION RATE: 18 BRPM | BODY MASS INDEX: 40.98 KG/M2

## 2022-03-09 DIAGNOSIS — G47.33 OSA ON CPAP: ICD-10-CM

## 2022-03-09 DIAGNOSIS — Z99.89 OSA ON CPAP: ICD-10-CM

## 2022-03-09 DIAGNOSIS — Z87.39 H/O: GOUT: ICD-10-CM

## 2022-03-09 DIAGNOSIS — D57.3 SICKLE CELL TRAIT: ICD-10-CM

## 2022-03-09 DIAGNOSIS — E78.2 MIXED HYPERLIPIDEMIA: ICD-10-CM

## 2022-03-09 DIAGNOSIS — E66.01 OBESITY, CLASS III, BMI 40-49.9 (MORBID OBESITY): ICD-10-CM

## 2022-03-09 DIAGNOSIS — Z71.3 DIETARY COUNSELING: ICD-10-CM

## 2022-03-09 DIAGNOSIS — Z01.818 PREOPERATIVE TESTING: ICD-10-CM

## 2022-03-09 DIAGNOSIS — R42 DIZZINESS: ICD-10-CM

## 2022-03-09 DIAGNOSIS — E66.01 OBESITY, CLASS III, BMI 40-49.9 (MORBID OBESITY): Primary | ICD-10-CM

## 2022-03-09 PROBLEM — R06.83 SNORING: Status: ACTIVE | Noted: 2022-03-09

## 2022-03-09 PROBLEM — G47.30 SLEEP APNEA: Status: ACTIVE | Noted: 2022-03-09

## 2022-03-09 PROBLEM — M10.9 GOUT OF MULTIPLE SITES: Status: ACTIVE | Noted: 2022-03-09

## 2022-03-09 PROBLEM — M10.9 GOUT OF MULTIPLE SITES: Status: RESOLVED | Noted: 2022-03-09 | Resolved: 2022-03-09

## 2022-03-09 LAB
ALBUMIN SERPL-MCNC: 3.8 G/DL (ref 3.5–5.2)
ALBUMIN/GLOB SERPL: 1.1 G/DL
ALP SERPL-CCNC: 96 U/L (ref 39–117)
ALT SERPL W P-5'-P-CCNC: 23 U/L (ref 1–33)
ANION GAP SERPL CALCULATED.3IONS-SCNC: 10.7 MMOL/L (ref 5–15)
AST SERPL-CCNC: 21 U/L (ref 1–32)
BASOPHILS # BLD AUTO: 0.03 10*3/MM3 (ref 0–0.2)
BASOPHILS NFR BLD AUTO: 0.4 % (ref 0–1.5)
BILIRUB SERPL-MCNC: 0.2 MG/DL (ref 0–1.2)
BUN SERPL-MCNC: 16 MG/DL (ref 6–20)
BUN/CREAT SERPL: 19 (ref 7–25)
CALCIUM SPEC-SCNC: 9.2 MG/DL (ref 8.6–10.5)
CHLORIDE SERPL-SCNC: 105 MMOL/L (ref 98–107)
CO2 SERPL-SCNC: 26.3 MMOL/L (ref 22–29)
CREAT SERPL-MCNC: 0.84 MG/DL (ref 0.57–1)
DEPRECATED RDW RBC AUTO: 43.5 FL (ref 37–54)
EGFRCR SERPLBLD CKD-EPI 2021: 82.7 ML/MIN/1.73
EOSINOPHIL # BLD AUTO: 0.04 10*3/MM3 (ref 0–0.4)
EOSINOPHIL NFR BLD AUTO: 0.5 % (ref 0.3–6.2)
ERYTHROCYTE [DISTWIDTH] IN BLOOD BY AUTOMATED COUNT: 15 % (ref 12.3–15.4)
GLOBULIN UR ELPH-MCNC: 3.6 GM/DL
GLUCOSE SERPL-MCNC: 73 MG/DL (ref 65–99)
HBA1C MFR BLD: 5.8 % (ref 4.8–5.6)
HCT VFR BLD AUTO: 38.3 % (ref 34–46.6)
HGB BLD-MCNC: 11.9 G/DL (ref 12–15.9)
IMM GRANULOCYTES # BLD AUTO: 0.01 10*3/MM3 (ref 0–0.05)
IMM GRANULOCYTES NFR BLD AUTO: 0.1 % (ref 0–0.5)
LYMPHOCYTES # BLD AUTO: 2.94 10*3/MM3 (ref 0.7–3.1)
LYMPHOCYTES NFR BLD AUTO: 36.2 % (ref 19.6–45.3)
MCH RBC QN AUTO: 24.8 PG (ref 26.6–33)
MCHC RBC AUTO-ENTMCNC: 31.1 G/DL (ref 31.5–35.7)
MCV RBC AUTO: 79.8 FL (ref 79–97)
MONOCYTES # BLD AUTO: 0.48 10*3/MM3 (ref 0.1–0.9)
MONOCYTES NFR BLD AUTO: 5.9 % (ref 5–12)
NEUTROPHILS NFR BLD AUTO: 4.62 10*3/MM3 (ref 1.7–7)
NEUTROPHILS NFR BLD AUTO: 56.9 % (ref 42.7–76)
NRBC BLD AUTO-RTO: 0 /100 WBC (ref 0–0.2)
PLATELET # BLD AUTO: 265 10*3/MM3 (ref 140–450)
PMV BLD AUTO: 10.3 FL (ref 6–12)
POTASSIUM SERPL-SCNC: 4.2 MMOL/L (ref 3.5–5.2)
PROT SERPL-MCNC: 7.4 G/DL (ref 6–8.5)
QT INTERVAL: 387 MS
RBC # BLD AUTO: 4.8 10*6/MM3 (ref 3.77–5.28)
SODIUM SERPL-SCNC: 142 MMOL/L (ref 136–145)
TSH SERPL DL<=0.05 MIU/L-ACNC: 1.32 UIU/ML (ref 0.27–4.2)
WBC NRBC COR # BLD: 8.12 10*3/MM3 (ref 3.4–10.8)

## 2022-03-09 PROCEDURE — 84443 ASSAY THYROID STIM HORMONE: CPT

## 2022-03-09 PROCEDURE — 93005 ELECTROCARDIOGRAM TRACING: CPT | Performed by: NURSE PRACTITIONER

## 2022-03-09 PROCEDURE — 71046 X-RAY EXAM CHEST 2 VIEWS: CPT

## 2022-03-09 PROCEDURE — 36415 COLL VENOUS BLD VENIPUNCTURE: CPT

## 2022-03-09 PROCEDURE — 83036 HEMOGLOBIN GLYCOSYLATED A1C: CPT

## 2022-03-09 PROCEDURE — 85025 COMPLETE CBC W/AUTO DIFF WBC: CPT

## 2022-03-09 PROCEDURE — 99215 OFFICE O/P EST HI 40 MIN: CPT | Performed by: NURSE PRACTITIONER

## 2022-03-09 PROCEDURE — 80053 COMPREHEN METABOLIC PANEL: CPT

## 2022-03-09 PROCEDURE — 93010 ELECTROCARDIOGRAM REPORT: CPT | Performed by: INTERNAL MEDICINE

## 2022-03-09 RX ORDER — CLASCOTERONE 1 G/100G
CREAM TOPICAL
COMMUNITY
Start: 2022-03-03

## 2022-03-09 RX ORDER — MULTIVIT WITH MINERALS/LUTEIN
250 TABLET ORAL DAILY
COMMUNITY

## 2022-03-09 RX ORDER — MULTIPLE VITAMINS W/ MINERALS TAB 9MG-400MCG
1 TAB ORAL DAILY
COMMUNITY

## 2022-03-09 RX ORDER — B-COMPLEX WITH VITAMIN C
TABLET ORAL
COMMUNITY

## 2022-03-09 RX ORDER — ATORVASTATIN CALCIUM 20 MG/1
20 TABLET, FILM COATED ORAL DAILY
COMMUNITY

## 2022-03-09 RX ORDER — MINOCYCLINE 40 MG/G
AEROSOL, FOAM TOPICAL
COMMUNITY
Start: 2022-03-04

## 2022-03-09 RX ORDER — UBIDECARENONE 75 MG
50 CAPSULE ORAL DAILY
COMMUNITY

## 2022-03-09 RX ORDER — ERGOCALCIFEROL (VITAMIN D2) 10 MCG
400 TABLET ORAL DAILY
COMMUNITY

## 2022-03-09 NOTE — PROGRESS NOTES
[facility]IATRIC SURGERY  [address] [id number] [phone]  [address] [id number] [phone]  Dept: [phone]  3/9/2022      Jazlyn Cole.  [id number]  [id number]  1965  female      Chief Complaint of weight gain; unable to maintain weight loss    History of Present Illness:   Jazlyn is a 56 y.o. female who presents today for evaluation, education and consultation regarding weight loss surgery. The patient is interested in the sleeve gastrectomy.      Diet History:Jazlyn has been overweight for at least 29 years, has been 35 pounds or more overweight for at least 29 years, has been 100 pounds or more overweight for 29 or more years and started dieting at age 30.  The most weight Jazlyn lost was unknown pounds on weight watchers and maintained the weight loss for unknown. Jazlyn describes her eating habits as snacking between meals, eating large meals, and drinking regular soda. Jazlyn Cole has tried Weight Watchers, Fasting, Physician monitored, reduced calorie, exercising and prescription medications among others with success of losing up to uknown pounds, but in each instance regained the weight.     See dietician documentation for complete history.    Bariatric Surgery Evaluation: The patient is being seen for an initial visit for bariatric surgery evaluation and education.     Bariatric Co-morbidities:  sleep apnea and dyslipidemia    Patient Active Problem List   Diagnosis   • Sickle cell trait ([facility])   • Chronic constipation   • Snoring   • SHAYE on CPAP   • Dizziness   • Obesity, Class III, BMI 40-49.9 (morbid obesity) ([facility])   • H/O: gout   • Mixed hyperlipidemia   • Dietary counseling   • Preoperative testing       Past Medical History:   Diagnosis Date   • Chronic constipation    • History of seizure    • Mixed hyperlipidemia    • SHAYE on CPAP    • Seizures ([facility])     no meds since 2012    • Sickle cell trait ([facility])        Past Surgical History:   Procedure Laterality Date   • BILATERAL BREAST REDUCTION     • CARPAL TUNNEL RELEASE     • CARPAL TUNNEL RELEASE WITH CUBITAL TUNNEL RELEASE      left    • HYSTERECTOMY  2007   • TUBAL ABDOMINAL LIGATION  1992       No Known Allergies      Current Outpatient Medications:   •  atorvastatin (LIPITOR) 20 MG tablet, Take 20 mg by mouth Daily., Disp: , Rfl:   •  ELDERBERRY PO, Take  by mouth., Disp: , Rfl:   •  multivitamin with minerals tablet tablet, Take 1 tablet by mouth Daily., Disp: , Rfl:   •  vitamin C (ASCORBIC ACID) 250 MG tablet, Take 250 mg by mouth Daily., Disp: , Rfl:   •  Vitamin D, Cholecalciferol, (CHOLECALCIFEROL) 10 MCG (400 UNIT) tablet, Take 400 Units by mouth Daily., Disp: , Rfl:   •  Zinc 100 MG tablet, Take  by mouth., Disp: , Rfl:   •  Amzeeq 4 % foam, , Disp: , Rfl:   •  vitamin B-12 (cyanocobalamin) 100 MCG tablet, Take 50 mcg by mouth Daily., Disp: , Rfl:   •  Winlevi 1 % cream, , Disp: , Rfl:     Social History     Socioeconomic History   • Marital status:    Tobacco Use   • Smoking status: Never Smoker   • Smokeless tobacco: Never Used   Vaping Use   • Vaping Use: Never used   Substance and Sexual Activity   • Alcohol use: Yes     Comment: occassionally   • Drug use: Never       Family History   Problem Relation Age of Onset   • Heart disease Mother    • Cancer Father    • Dementia Father    • Sickle cell anemia Son          Review of Systems:  Review of Systems   Constitutional: Positive for activity change and appetite change.   Respiratory: Negative for chest tightness and shortness of breath.    Cardiovascular: Negative for chest pain and palpitations.   Gastrointestinal: Positive for constipation.   Neurological:        Was diagnosed with seizures as child but found that was an incorrect diagnosis in adulthood   All other systems reviewed and are negative.      Physical Exam:  Vital Signs:  Weight: 112 kg (246 lb)   Body  mass index is 41.24 kg/m².  Temp: 96.9 °F (36.1 °C)   Heart Rate: 78   BP: 132/81     Physical Exam  Vitals reviewed.   Constitutional:       General: She is not in acute distress.     Appearance: Normal appearance. She is morbidly obese.   HENT:      Head: Normocephalic and atraumatic.      Mouth/Throat:      Mouth: Mucous membranes are moist.   Eyes:      General: No scleral icterus.     Extraocular Movements: Extraocular movements intact.      Conjunctiva/sclera: Conjunctivae normal.      Pupils: Pupils are equal, round, and reactive to light.   Cardiovascular:      Rate and Rhythm: Normal rate and regular rhythm.      Heart sounds: Normal heart sounds.   Pulmonary:      Effort: Pulmonary effort is normal. No respiratory distress.      Breath sounds: Normal breath sounds.   Abdominal:      General: Bowel sounds are normal. There is no distension.      Palpations: Abdomen is soft. There is no mass.      Tenderness: There is no abdominal tenderness. There is no guarding.   Musculoskeletal:         General: Normal range of motion.      Cervical back: Normal range of motion and neck supple.   Skin:     General: Skin is warm and dry.   Neurological:      General: No focal deficit present.      Mental Status: She is alert and oriented to person, place, and time.   Psychiatric:         Mood and Affect: Mood normal.         Behavior: Behavior normal.         Thought Content: Thought content normal.         Judgment: Judgment normal.            Assessment:         Jazlyn Cole is a 56 y.o. year old female with medically complicated severe obesity. Weight: 112 kg (246 lb), Body mass index is 41.24 kg/m². and weight related problems including sleep apnea and dyslipidemia.    I explained in detail, potential surgical options of interest to the patient including the RNY gastric bypass, sleeve gastrectomy, and gastric band while considering the patient's medical history. At this time, the patient expressed interest in the  Laparoscopic Sleeve  All of those procedures can be performed laparoscopically but there is a chance to convert to open if any technical challenges or complications do occur.  Bariatric surgery is not cosmetic surgery but rather a tool to help a patient make a life-long commitment lifestyle changes including diet, exercise, behavior changes, and taking supplemental vitamins and minerals.    Due to the patient's BMI, history, and co-morbidities related to potential surgical complications were evaluated. Due to Jazlyn Cole's risk factors female will obtain the following prior to being scheduled for surgical intervention:    A letter of medical support as well as a history and physical must be obtained from her primary care provider. The patient was given a copy of a sample form, that will suffice as their letter to take to their primary are provider.    A referral for pre-operative psychological evaluation was ordered for the patient to evaluate candidacy as well as provide mental health support, should it be warranted before or after surgery.    Notes from primary care provider and previous labs were reviewed and  EKG, CXR, EGD with biopsy, psychology clearance, CBC, CMP, Hba1c and TSHwere ordered at this time. These will be drawn and patient will be notified with results. Patient will complete new, pre-operative radiology prior to being scheduled for surgery.    Jazlyn Cole has been diagnosed with SHAYE and is compliant with CPAP usage every night. The risks, as they relate to chronic hypercapnia r/t untreated SHAYE were discussed with the patient and She verbalized understanding.     A pre-operative diagnostic esophagogastroduodenoscopy with biopsy for evaluation will be ordered and scheduled for this patient. The risks and benefits of the procedure were discussed with the patient in detail and all questions were answered.  Possibility of perforation, bleeding, aspiration, anoxic brain injury, respiratory  and/or cardiac arrest and death were discussed.   She received handouts regarding, all questions were answered.     Jazlyn Cole verbalized understanding related to COVID-19 pre-procedure testing policies and has consented to a preoperative test 48-72 hours before She's scheduled EGD and bariatric surgical procedure.     The risks, benefits, alternatives, and potential complications of all of the sleeve gastrectomy explained in detail including, but not limited to death, anesthesia and medication adverse effect/DVT, pulmonary embolism, trocar site/incisional hernia, wound infection, abdominal infection, bleeding, failure to lose weight or gain weight and change in body image, metabolic complications with calcium, thiamine, vitamin B12, folate, iron, and anemia.    The patient was advised to start a high protein, low fat and low carbohydrate diet  start routine exercise including but not limited to 150 minutes per week. The patient received a resistance band along with a handout of exercises. The patient was given individualized information by our dietician along with handouts.     The patient was given information regarding the LILIAN educational video. LILIAN is an internet based educational video which explains the sourgical procedure and answers basic questions regarding the procedure. The patient was provided with instructions and a password to watch the video.    The patient understands the surgical procedures and the different surgical options that are available.  She understands the lifestyle changes that would be required after surgery and has agreed to participate in a pre-operative and postoperative weight management program.  She also expressed understanding of possible risks, had several questions answered and desires to proceed.    I think she is a good candidate for this surgery, and is interested in a sleeve gastrectomy.    Encounter Diagnoses   Name Primary?   • Obesity, Class III, BMI 40-49.9  (morbid obesity) ([facility]) Yes   • Mixed hyperlipidemia    • SHAYE on CPAP    • Preoperative testing    • Sickle cell trait ([facility])    • H/O: gout    • Dizziness    • Dietary counseling        Plan:    Patient will be evaluated by a bariatric dietician psychologist before undergoing a multidisciplinary review of her candidacy. We discussed weight loss requirements prior to surgery and rationale, as well as other program requirements to ensure the safest approach to surgery. We spent time discussing different surgical procedures and plan of care throughout their lifespan to ensure long term success in achieving and maintaining a healthier weight. Patient will proceed with preoperative lab work, radiology, and endoscopy after obtaining agreed upon specialty, clearances, sleep study, and letter of support from her primary care provider.    Total time spent during this encounter today was 60 minutes and includes preparing for the visit, reviewing tests, performing a medically appropriate examination and/or evaluations, counseling and educating the patient/family/caregiver, ordering medications, tests, or procedures, documenting information in the medical record and independently interpreting results and communicating that information with the patient/family/caregiver.    Zahra Forrest, APRN  3/9/2022

## 2022-03-09 NOTE — PROGRESS NOTES
"Bariatric Nutrition Counseling Interview    Patient Name: Jazlyn Cole    YOB: 1965   Age: 56 y.o.  Sex: female  MRN: [id number]     Procedure considering: sleeve    Height: 64.76\"            Current weight: 246 lbs   BMI: 41.24 kg/m²                          Highest weight: 246 lbs                              Lowest weight: 135 lbs     Patient goals: 150 lbs  Weight history: weight gain as a result of pregnancy  Additional health issues to consider: dyslipidemia, sleep apnea    Patient has tried to lose weight in the past including Weight Watchers, fasting, phentermine and exericse. Patient has lost up to 40 lbs on diets, but was unable to maintain this long term.     Diet history revealed no diet restrictions or food allergies. Diet recall: B: none or oatmeal with sugar and milk; L: fried gizzards and dip; D: meat (sometimes fried), vegetables, starch; S: chips and salsa or guacamole, banana. Drinking water, soda, fruit juice  Patient identifies problem areas to be eating in response to stress, high calorie food choices and inactivity     Exercise: none     Pre and post op nutrition education completed and program materials provided. Emphasized the importance of taking in at least 70 g protein and 64 oz fluid daily. Discussed personal habits and lifestyle behaviors that may influence weight loss efforts. Patient verbalized understanding and questions were answered.  Short term goals: decrease/eliminate high calorie and carbonated beverages   Additional nutrition counseling available and encouraged both pre and post op.  Patient demonstrated a good comprehension of diet requirements and commitment to make healthy changes. Jazlyn Cole appears to be a suitable candidate for bariatric surgery from a nutritional standpoint.      Maria Ines Arnold RD  03/09/22  11:52 EST   "

## 2022-05-13 DIAGNOSIS — Z01.818 PREOPERATIVE TESTING: ICD-10-CM

## 2022-05-13 DIAGNOSIS — E66.01 OBESITY, CLASS III, BMI 40-49.9 (MORBID OBESITY): Primary | ICD-10-CM

## 2023-09-08 NOTE — PROGRESS NOTES
Addended by: CUATE KING on: 9/8/2023 06:20 PM     Modules accepted: Orders     Detail Level: Generalized Re-Assessment / Re-Certification        Patient: Jazlyn Cole   : 1965  Diagnosis/ICD-10 Code:  Acute right-sided low back pain with right-sided sciatica [M54.41]  Referring practitioner: Shay Sanchez MD  Patient seen for 6 sessions      Subjective:   Jazlyn Cole reports:  Subjective Questionnaire: QuickDASH score: 40.91  Clinical Progress: improved  Home Program Compliance: Yes  Treatment has included: therapeutic exercise, neuromuscular re-education and therapeutic activity    Subjective Evaluation    History of Present Illness    Subjective comment: The pt reports her LBP and neck pains are improving, has some soreness, but still not at her PLOF.  The pt reports she had increased soreness from her last appointment, but fel tbetter.  The pt also reports she had numbness/tingling into bilateral hands Friday and  nights that she addressed by shaking her hands, which helps.     Objective       Active Range of Motion     Additional Active Range of Motion Details  Lumbar ROM: The pt has full ROM for all lumbar directions and no reports of pain.    Cervical ROM: The pt has full ROM for all lumbar directions and no reports of pain.     strength: R: 18#/force L: 20#/force     Assessment & Plan     Assessment  Assessment details: The pt has improved from her initial evaluation with an increase in lumbar and cervical ROM, demonstrated strength for completing exercises with resistance bands, and an increase in activity tolerance by completing more exercises.  The pt continues to report cervical and LBP that alternates between which is worse between each appointment.  However, the pt reports a 3/10 pain overall which is an improvement from her initial 5/10 pains.  The pt's strength for work related tasks has not been assessed secondary to calming the affected tissues first before attempting to increase strength.  The pt would benefit from continued PT care to address the strength limitations  Detail Level: Detailed and preparation for her gradual functional return to her PLOF necessary for her work duties.  Prognosis details: Short Term Goals (2 weeks)    Patient is independent with HEP. MET  Patient is able to sleep without being disrupted by pain. NOT MET, the pt reports she is not sleeping well at all, but cannot identify what is keeping her up/waking her.  Patient has full AROM/PROM of right shoulder abd. MET  Patient has greater than 50% improvement overall. MET, subjective report 80%  Patient is able to reach into overhead cabinets with minimal discomfort. MET    Long Term Goals (4 weeks)    Patient is able to return to work with minimal to no discomfort. NOT MET  Patient is able to lift 50 lbs from floor to waist. NOT MET  Patient is able to lift 20 lbs from waist to shoulder. NOT MET  Patient is able to have equal  strength bilaterally. 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 NOT MET  Patient is able to open a jar as needed for daily tasks.  MET    Plan  Plan details: Continue skilled PT care to address functional limitations of strength to return the pt to her PLOF, as appropriate.        Progress toward previous goals: Partially Met  Recommendations: Continue as planned  Timeframe: 1 month  Prognosis to achieve goals: good    PT Signature: Hannah Bashir PT      Based upon review of the patient's progress and continued therapy plan, it is my medical opinion that Jazlyn Cole should continue physical therapy treatment at [facility] PHYSICAL THERAPY  [address] [place] [id number] [phone].    Signature: __________________________________  Shay Sanchez MD    Manual Therapy:    9     mins  42614;  Therapeutic Exercise:    21     mins  06756;     Neuromuscular Abbie:    25    mins  75532;    Therapeutic Activity:     0     mins  40285;     Gait Trainin     mins   03094;     Ultrasound:     0     mins  05610;    Work Hardening           0      mins [id number]  Iontophoresis               0   mins 20843    Timed Treatment:   55   mins   Total Treatment:     55   mins